# Patient Record
Sex: FEMALE | Race: WHITE | NOT HISPANIC OR LATINO | Employment: OTHER | URBAN - NONMETROPOLITAN AREA
[De-identification: names, ages, dates, MRNs, and addresses within clinical notes are randomized per-mention and may not be internally consistent; named-entity substitution may affect disease eponyms.]

---

## 2024-06-27 NOTE — PROGRESS NOTES
[facility]    RADIATION ONCOLOGY CONSULTATION      Magdalena Walls  is referred by Dr.Bret EATON Friday for an oncology consultation.    PRIMARY PHYSICIAN: No primary care provider on file.     Cancer Staging   No matching staging information was found for the patient.      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 Her studies show her to endoscopically T3 disease with a bulky lymph node that was negative on biopsy. She has received FOLFOX chemotherapy with little change on re-endoscopy. She is here with her daughter to discuss radiation options. Her performance status  appears ECOG 1.     PLAN: I reviewed that she has a few options. One would be surgery with adjuvant radiation if needed. The second would be radiation and capecitabine now.  The third would be to switch to a more palliative, symptom control approach. I indicated that the dose that can be given to the stomach for primary treatment is the same as one would use in the adjuvant setting. Thus long term control with just radiation would be relatively uncertain.  So, if she is a surgical candidate, my choice would be to do gastrectomy. This also gives us a better pathological understanding of the tumor. If, of course, she is not a surgical candidate, then either upfront or delayed (palliative) radiation would be reasonable.   She will see  Friday tomorrow and I think she is leaning towards surgery if possible. She clearly understands that either approach has significant risks associated with them.    No orders of the defined types were placed in this encounter.     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  Had noticed dysphagia and regurgitation when eating  foods, not noticed with liquids.  History as below    10/10/2023 XR esophagram: Mild esophageal dysmotility.  Small Zenker's diverticulum, which  cleared spontaneously.  No hiatal hernia.  No GERD    2/28/2024 stomach biopsies: Poorly differentiated adenocarcinoma with signet ring features.  Focal associated ulceration.  Esophagus, biopsies: Squamous mucosa with no significant pathologic change.    3/14/2024 CT chest abdomen pelvis with contrast: Resolution of ascites.  Significant thickening of the mid distal stomach, correlate with pathology.  No evidence of distant mass, perigastric lymphadenopathy or distant lymphadenopathy.  Chronic findings as above    3/25/2024 PET/CT: The tracer uptake along the gastric mucosa is nonspecific as discussed, physiologic versus neoplastic cannot reliably differentiated.  No findings of distant metastatic disease.    6/10/2024 stomach, gastric mass, biopsy: Gastric adenocarcinoma, poorly cohesive type with focal signet-ring cell features, ER BB 2 negative. Upper endoscopy reports partially obstructing stenosis at the 50 cm ana of the stomach, over a span of roughly 2 to 3 cm, which is unchanged from previous exam with exception of more ulceration present today.     6/18/2024 medical oncology visit: Plan to discontinue further chemotherapy, and decide about additional local treatment such as surgery versus chemoradiation.  If patient shows radiation we would plan for concurrent with the use of capecitabine    Scheduling Conflicts: May require ride assistance   Systemic Therapy: pending decision of treatment options   Pacemaker: denies   Hx of autoimmune disorders: denies   Previous Radiation Therapy: denies       Depression Screening Follow Up        Follow Up Actions Taken  Patient counseled, no additional follow up at this time.               No past medical history on file.    No past surgical history on file.    No family history on file.    Social History     Tobacco Use     Smoking status: Not on file    Smokeless tobacco: Not on file   Substance Use Topics    Alcohol use: Not on file         CURRENT MEDICATIONS:   No current outpatient medications on file.     No current facility-administered medications for this visit.         ALLERGIES:  Not on File        Review of Systems   Constitutional:  Positive for malaise/fatigue. Negative for chills, fever and weight loss.   Respiratory:  Negative for shortness of breath.    Cardiovascular:  Negative for chest pain.   Gastrointestinal:  Positive for diarrhea, nausea and vomiting.   Psychiatric/Behavioral:  The patient does not have insomnia.            VITAL SIGNS:  There were no vitals taken for this visit.  Wt Readings from Last 4 Encounters:   No data found for Wt       PHYSICAL EXAM:  Constitutional: awake, alert, cooperative, no apparent distress, and appears stated age  Eyes: Lids and lashes normal,sclera clear, conjunctiva normal  ENT: normocephalic, without obvious abnormality  Hematologic / Lymphatic: no cervical lymphadenopathy and no supraclavicular lymphadenopathy  Respiratory: No increased work of breathing, good air exchange, clear to auscultation bilaterally, no crackles or wheezing  Cardiovascular: normal S1 and S2  GI: No scars, normal bowel sounds, soft, non-distended  Skin: no redness, warmth, or swelling  Musculoskeletal: tone is normal  Neurologic: Awake, alert, oriented to name, place and time. Presents in wheel chair   Neuropsychiatric: General: normal, calm, and normal eye contact    Physician attestation:  I saw and evaluated patient as part of a shared APRN visit. I personally reviewed her chart including all pertinent reports and notes.. Key management decisions made by me and carried out under my direction include: discussion of therapeutic options concerning surgery versus radiation with curative intent..     On this date, DOMO spent 15 minutes with the patient and 5 minutes on chart preparation and review, for a  total of 20 minutes   Physician spent 25 minutes with the patient and 30 minutes on chart, image, and documentation review, for a total of 55 minutes.     Total billable time: 75 minutes on this date of service           LEONEL Horton CNP

## 2024-06-28 PROBLEM — Z87.39 HISTORY OF GOUT: Status: ACTIVE | Noted: 2024-03-20

## 2024-06-28 PROBLEM — A49.9 ESBL (EXTENDED SPECTRUM BETA-LACTAMASE) PRODUCING BACTERIA INFECTION: Chronic | Status: ACTIVE | Noted: 2018-04-09

## 2024-06-28 PROBLEM — K75.81 NONALCOHOLIC STEATOHEPATITIS (NASH): Status: ACTIVE | Noted: 2018-05-24

## 2024-06-28 PROBLEM — H02.052 TRICHIASIS OF RIGHT LOWER EYELID: Status: ACTIVE | Noted: 2023-07-19

## 2024-06-28 PROBLEM — E88.89: Status: ACTIVE | Noted: 2024-04-22

## 2024-06-28 PROBLEM — H25.813 COMBINED FORMS OF AGE-RELATED CATARACT, BILATERAL: Status: ACTIVE | Noted: 2021-06-10

## 2024-06-28 PROBLEM — H47.233 CUPPING OF OPTIC DISC, BILATERAL: Status: ACTIVE | Noted: 2021-09-15

## 2024-06-28 PROBLEM — L82.1 SEBORRHEIC KERATOSIS: Status: ACTIVE | Noted: 2024-02-08

## 2024-06-28 PROBLEM — K22.5 ZENKER DIVERTICULUM: Status: ACTIVE | Noted: 2023-10-19

## 2024-06-28 PROBLEM — K64.4 HEMORRHOIDS, EXTERNAL: Status: ACTIVE | Noted: 2024-03-20

## 2024-06-28 PROBLEM — Z96.1 BILATERAL ARTIFICIAL LENS IMPLANT: Status: ACTIVE | Noted: 2018-08-01

## 2024-06-28 PROBLEM — Z16.12 ESBL (EXTENDED SPECTRUM BETA-LACTAMASE) PRODUCING BACTERIA INFECTION: Chronic | Status: ACTIVE | Noted: 2018-04-09

## 2024-06-28 PROBLEM — R13.19 ESOPHAGEAL DYSPHAGIA: Status: ACTIVE | Noted: 2023-10-03

## 2024-06-28 PROBLEM — H40.003 GLAUCOMA SUSPECT OF BOTH EYES: Status: ACTIVE | Noted: 2017-04-20

## 2024-06-28 PROBLEM — E83.42 HYPOMAGNESEMIA: Status: ACTIVE | Noted: 2023-10-19

## 2024-06-28 PROBLEM — E13.3299: Status: ACTIVE | Noted: 2019-05-16

## 2024-06-28 PROBLEM — H53.411: Status: ACTIVE | Noted: 2021-09-15

## 2024-06-28 PROBLEM — C16.9 GASTRIC ADENOCARCINOMA (H): Status: ACTIVE | Noted: 2024-03-06

## 2024-07-01 ENCOUNTER — ONCOLOGY VISIT (OUTPATIENT)
Dept: RADIATION ONCOLOGY | Facility: HOSPITAL | Age: 79
End: 2024-07-01
Attending: RADIOLOGY
Payer: COMMERCIAL

## 2024-07-01 VITALS
DIASTOLIC BLOOD PRESSURE: 64 MMHG | OXYGEN SATURATION: 99 % | RESPIRATION RATE: 16 BRPM | WEIGHT: 139 LBS | BODY MASS INDEX: 23.73 KG/M2 | HEART RATE: 91 BPM | TEMPERATURE: 99 F | SYSTOLIC BLOOD PRESSURE: 122 MMHG | HEIGHT: 64 IN

## 2024-07-01 DIAGNOSIS — C16.9 GASTRIC ADENOCARCINOMA (H): Primary | ICD-10-CM

## 2024-07-01 PROCEDURE — G0463 HOSPITAL OUTPT CLINIC VISIT: HCPCS

## 2024-07-01 PROCEDURE — 99205 OFFICE O/P NEW HI 60 MIN: CPT | Mod: FS | Performed by: RADIOLOGY

## 2024-07-01 PROCEDURE — 99417 PROLNG OP E/M EACH 15 MIN: CPT | Performed by: RADIOLOGY

## 2024-07-01 RX ORDER — LOSARTAN POTASSIUM 100 MG/1
1 TABLET ORAL DAILY
COMMUNITY
Start: 2024-03-20

## 2024-07-01 RX ORDER — BEVACIZUMAB 2MG/0.08ML
1.25 SYRINGE (ML) INTRAOCULAR
COMMUNITY
Start: 2024-06-05 | End: 2024-10-22

## 2024-07-01 RX ORDER — DULAGLUTIDE 0.75 MG/.5ML
0.75 INJECTION, SOLUTION SUBCUTANEOUS
COMMUNITY
Start: 2023-08-16

## 2024-07-01 RX ORDER — METFORMIN HCL 500 MG
1000 TABLET, EXTENDED RELEASE 24 HR ORAL 2 TIMES DAILY WITH MEALS
COMMUNITY
Start: 2024-03-20

## 2024-07-01 RX ORDER — ALLOPURINOL 300 MG/1
1 TABLET ORAL DAILY
COMMUNITY
Start: 2024-03-20

## 2024-07-01 RX ORDER — DIPHENHYDRAMINE HYDROCHLORIDE 25 MG/1
50 CAPSULE ORAL
COMMUNITY
Start: 2024-03-07

## 2024-07-01 RX ORDER — MAGNESIUM CHLORIDE 71.5 G/G
2 TABLET ORAL DAILY
COMMUNITY
Start: 2024-03-20

## 2024-07-01 RX ORDER — LATANOPROST 50 UG/ML
1 SOLUTION/ DROPS OPHTHALMIC AT BEDTIME
COMMUNITY
Start: 2024-03-21

## 2024-07-01 RX ORDER — PEN NEEDLE, DIABETIC 32GX 5/32"
NEEDLE, DISPOSABLE MISCELLANEOUS
COMMUNITY
Start: 2024-06-13

## 2024-07-01 RX ORDER — CICLOPIROX 80 MG/ML
SOLUTION TOPICAL AT BEDTIME
COMMUNITY
Start: 2024-05-28

## 2024-07-01 RX ORDER — HYDROCORTISONE ACETATE 25 MG/1
25 SUPPOSITORY RECTAL 2 TIMES DAILY PRN
COMMUNITY
Start: 2024-03-20

## 2024-07-01 RX ORDER — AMLODIPINE BESYLATE 10 MG/1
1 TABLET ORAL DAILY
COMMUNITY
Start: 2024-03-20

## 2024-07-01 RX ORDER — BLOOD SUGAR DIAGNOSTIC
STRIP MISCELLANEOUS
COMMUNITY
Start: 2024-04-30

## 2024-07-01 RX ORDER — CYCLOBENZAPRINE HCL 5 MG
1 TABLET ORAL 3 TIMES DAILY PRN
COMMUNITY
Start: 2024-04-15

## 2024-07-01 RX ORDER — PROCHLORPERAZINE MALEATE 10 MG
10 TABLET ORAL EVERY 8 HOURS PRN
COMMUNITY
Start: 2024-04-09

## 2024-07-01 RX ORDER — ROSUVASTATIN CALCIUM 5 MG/1
5 TABLET, COATED ORAL
COMMUNITY
Start: 2024-03-20

## 2024-07-01 ASSESSMENT — ENCOUNTER SYMPTOMS
FEVER: 0
WEIGHT LOSS: 0
INSOMNIA: 0
VOMITING: 1
CHILLS: 0
SHORTNESS OF BREATH: 0
DIARRHEA: 1
NAUSEA: 1

## 2024-07-01 ASSESSMENT — PAIN SCALES - GENERAL: PAINLEVEL: MODERATE PAIN (4)

## 2024-07-01 ASSESSMENT — PATIENT HEALTH QUESTIONNAIRE - PHQ9: SUM OF ALL RESPONSES TO PHQ QUESTIONS 1-9: 7

## 2024-07-01 NOTE — PROGRESS NOTES
"Radiation Oncology Rooming Note    July 1, 2024 2:20 PM     Magdalena Walls is a 78 year old female who presents for:       Chief Complaint   Patient presents with    Consult     Radiation therapy consult       Initial Vitals: /64 (BP Location: Right arm, Patient Position: Chair, Cuff Size: Adult Regular)   Pulse 91   Temp 99  F (37.2  C) (Tympanic)   Resp 16   Ht 1.626 m (5' 4\")   Wt 63 kg (139 lb)   SpO2 99%   BMI 23.86 kg/m     Estimated body mass index is 23.86 kg/m  as calculated from the following:    Height as of this encounter: 1.626 m (5' 4\").    Weight as of this encounter: 63 kg (139 lb).   Body surface area is 1.69 meters squared.  Moderate Pain (4) Comment: Data Unavailable       Allergies reviewed: Yes  Medications reviewed: Yes      Patient completed the following questionnaires: PHQ-9 and NCCN Distress Thermometer.       Patient was assessed using the NCCN psychosocial distress thermometer. Patient rated the score as a 0. Patient rated current stressors as NA. Stressors brought to the attention of provider for a score of 6 or greater or per nurses discretion.       Patient received folder containing the following:  advance directives information/application  financial letter  vaccines during cancer treatment hand out  mental health services and providers packet  cancer resource list  cancer rehab information handout  family support group handouts  radiation therapy business card      Financial assistance resources given to patient:  [employer] application  [employer] application   [employer]      Nutrition Assessment    Height: 5' 4\" Weight: 139# (stated weight)   Usual Weight: 189# Weight Change: -50# over the past 3 years      Past Medical History:   Diagnosis Date    Ametropia 04/12/2011    Formatting of this note might be different from the original.   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   Date and Source of first known ESBL:  4/6/2018  -  URINE (E.coli)      Contact Precautions are indicated for hospitalized patients. Contact Infection Prevention  for your facility regarding criteria for removal from isolation.      Esophageal dysphagia 10/03/2023    Essential hypertension 07/06/2005    Gastric adenocarcinoma (H) 03/06/2024    Generalized osteoarthritis 05/19/2016    Glaucoma suspect of both eyes 04/20/2017    Hemorrhoids, external 03/20/2024    History of gout 03/20/2024    History of nonmelanoma skin cancer 10/03/2013    Formatting of this note might be different from the original.   BCC forehead 2013, BCC nose 1994      Hypomagnesemia 10/19/2023    Idiopathic chronic gout of multiple sites without tophus 07/06/2005    Formatting of this note might be different from the original.   Updated per 10/1/17 IMO import      Incipient senile cataract, bilateral 04/12/2011    Macular edema due to type 2 diabetes mellitus (H) 12/22/2016    Macular exudate 06/16/2016    Mixed hyperlipidemia 03/01/2013    Nonalcoholic steatohepatitis (EDWARDS) 05/24/2018    Osteoarthritis of carpometacarpal joint of right thumb 03/27/2014    Personal history of malignant neoplasm of breast 02/02/2005    Formatting of this note might be different from the original.   Per 9/19/12 encounter with Dr. Greenfield no recurrence      Presbyopia 04/12/2011    Rosacea 07/06/2005    Scotoma involving central area in visual field of right eye 09/15/2021    Seborrheic keratosis 02/08/2024    Tear film insufficiency 04/12/2011    Formatting of this note  might be different from the original.   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 Receiving Chemotherapy? Yes  - 1 point  2. Weight Loss per Month (in pounds) Based on Usual Weight: 0    100# 100-120# 120-150# 150-200# greater than 200# Pt. System   greater than 5 5 - 6 6 - 8 7 - 10 greater than 10 1 Point   greater than 7 7- 9 9 - 11 11 - 14 greater than 15 2 Points   greater than 10 10 - 12 12 - 15 15 - 20 greater than 20 3 Points       3. Eating Problems: ( 1 Point for Each Problem)       Loss of Appetite     Yes  - 1 point    Difficulty Swallowing    No - 0 points   Nausea    Yes  - 1 point    Early Satiety    Yes  - 1 point   Vomiting    Yes  - 1 point    Taste/Smell Aversions  No - 0 points    Diarrhea    Yes  - 1 point    Esophageal Reflux   No - 0 points   Mouth Soreness/Difficulty Chewing  No - 0 points          Total: 5    4.  Automatic Referral for the Following Diagnosis or Situations: Patient declines referral.     Comments: Declines meeting with dietician.     Total Score: 6 (Scores greater than or equal to 4 will receive a Dietician Consult)      Emily Morgan RN